# Patient Record
Sex: FEMALE | Race: OTHER
[De-identification: names, ages, dates, MRNs, and addresses within clinical notes are randomized per-mention and may not be internally consistent; named-entity substitution may affect disease eponyms.]

---

## 2017-04-13 ENCOUNTER — HOSPITAL ENCOUNTER (OUTPATIENT)
Dept: HOSPITAL 62 - WI | Age: 67
End: 2017-04-13
Attending: SURGERY
Payer: COMMERCIAL

## 2017-04-13 DIAGNOSIS — Z12.31: Primary | ICD-10-CM

## 2018-04-16 ENCOUNTER — HOSPITAL ENCOUNTER (OUTPATIENT)
Dept: HOSPITAL 62 - WI | Age: 68
End: 2018-04-16
Attending: SURGERY
Payer: MEDICARE

## 2018-04-16 DIAGNOSIS — Z12.31: Primary | ICD-10-CM

## 2018-04-16 NOTE — WOMENS IMAGING REPORT
EXAM DESCRIPTION:  RIGHT SCREENING MAMMO W/CAD



COMPLETED DATE/TIME:  4/16/2018 9:18 am



REASON FOR STUDY:  ROUTINE SCREENING;Z12.31 Z12.31  ENCNTR SCREEN MAMMOGRAM FOR MALIGNANT NEOPLASM OF
 SUE



COMPARISON:  4/13/2017 and 4/12/2016



TECHNIQUE:  Standard craniocaudal and mediolateral oblique views of the breast recorded using digital
 acquisition.



LIMITATIONS:  None.



FINDINGS:  BREAST: The right

Findings present which are benign by mammographic criteria. No suspicious masses, calcifications or a
rchitectural distortion.

Pertinent benign findings: Stable benign-appearing calcifications.

Read with the assistance of CAD.

.[facility] - R2 Cenova Version 1.3

.[facility] - R2 Cenova Version 1.3

.[facility] - R2 Cenova Version 2.4

.[facility] - R2 Cenova Version 2.4

.[facility] - R2  Version 9.2

Benign mammographic findings may include one or more of the following:  Smooth masses, popcorn/rim/co
arse calcifications, asymmetries, post-procedure changes, and lesions with long-standing stability.



IMPRESSION:  NORMAL MAMMOGRAM.  BIRADS 2.



BREAST DENSITY:  c. The breasts are heterogeneously dense, which may obscure small masses.



BIRAD:  2 BENIGN FINDING(S)



RECOMMENDATION:  RECOMMENDATION:  ROUTINE SCREENING.



COMMENT:  The patient has been notified of the results by letter per SA requirements. Additional no
tification policies are in place for contacting patient with suspicious or incomplete findings.

Quality ID #225: The American College of Radiology recommends an annual screening mammogram for women
 aged 40 years or over. This facility utilizes a reminder system to ensure that all patients receive 
reminder letters, and/or direct phone calls for appointments. This includes reminders for routine scr
eening mammograms, diagnostic mammograms, or other Breast Imaging Interventions when appropriate.  Th
is patient will be placed in the appropriate reminder system.

The American College of Radiology (ACR) has developed recommendations for screening MRI of the breast
s in certain patient populations, to be used in conjunction with mammography. Breast MRI surveillance
 may be appropriate for women with more than 20% lifetime risk of developing breast cancer as determi
zulema by genetic testing, significant family history of the disease, or history of mantle radiation for
 Hodgkins Disease. ACR Practice Guidelines 2008.



TECHNICAL DOCUMENTATION:  FINDING NUMBER: (1)

ASSESSMENT: (1)

JOB ID:  [id number]

 2011 [employer]- All Rights Reserved



Reading location - IP/workstation name: [email or web address]

## 2019-06-07 ENCOUNTER — HOSPITAL ENCOUNTER (OUTPATIENT)
Dept: HOSPITAL 62 - WI | Age: 69
End: 2019-06-07
Attending: INTERNAL MEDICINE
Payer: COMMERCIAL

## 2019-06-07 DIAGNOSIS — Z12.31: Primary | ICD-10-CM

## 2019-06-07 NOTE — WOMENS IMAGING REPORT
EXAM DESCRIPTION:  3D SCREENING MAMMO RIGHT



COMPLETED DATE/TIME:  6/7/2019 10:14 am



REASON FOR STUDY:  Z12.31 ROUTINE RT UNILATERAL SCREENING Z12.31  ENCNTR SCREEN MAMMOGRAM FOR MALIGNA
NT NEOPLASM OF SUE



COMPARISON:  4/16/2018 and 4/13/2017.



EXAM PARAMETERS:  Standard craniocaudal and mediolateral oblique views of the breast recorded using d
igital acquisition and breast tomosynthesis.

Read with the assistance of CAD.

.[facility] - R2  Version 9.2



LIMITATIONS:  None.



FINDINGS:  BREAST LATERALITY: right

No suspicious masses, suspicious calcifications or architectural distortion. No areas of suspicion.



IMPRESSION:  NEGATIVE  MAMMOGRAM.  BIRADS 1.



BREAST DENSITY:  c. The breasts are heterogeneously dense, which may obscure small masses.



BIRAD:  ASSESSMENT:  1 Negative



RECOMMENDATION:  RECOMMENDATION:  ROUTINE SCREENING.



COMMENT:  The patient has been notified of the results by letter per SA requirements. Additional no
tification policies are in place for contacting patient with suspicious or incomplete findings.

Quality ID #225: The American College of Radiology recommends an annual screening mammogram for women
 aged 40 years or over. This facility utilizes a reminder system to ensure that all patients receive 
reminder letters, and/or direct phone calls for appointments. This includes reminders for routine scr
eening mammograms, diagnostic mammograms, or other Breast Imaging Interventions when appropriate.  Th
is patient will be placed in the appropriate reminder system.



TECHNICAL DOCUMENTATION:  FINDING NUMBER: (1)

ASSESSMENT: (1)

JOB ID:  [id number]

 2011 [employer]- All Rights Reserved



Reading location - IP/workstation name: [email or web address]

## 2020-07-30 ENCOUNTER — HOSPITAL ENCOUNTER (OUTPATIENT)
Dept: HOSPITAL 62 - WI | Age: 70
End: 2020-07-30
Attending: INTERNAL MEDICINE
Payer: MEDICARE

## 2020-07-30 DIAGNOSIS — Z12.31: Primary | ICD-10-CM

## 2020-07-30 NOTE — WOMENS IMAGING REPORT
EXAM DESCRIPTION:  3D SCREENING MAMMO RIGHT



IMAGES COMPLETED DATE/TIME:  7/30/2020 7:58 am



REASON FOR STUDY:  Z12.31 ENCOUNTER FOR SCREENING MAMMOGRAM FOR MALIGNANT NEOPLASM OF BREAST Z12.31  
ENCNTR SCREEN MAMMOGRAM FOR MALIGNANT NEOPLASM OF SUE



COMPARISON:  2015



EXAM PARAMETERS:  Standard craniocaudal and mediolateral oblique views of the breast recorded using d
igital acquisition and breast tomosynthesis.

Read with the assistance of CAD.

.[facility]  Version 9.2



LIMITATIONS:  None.



FINDINGS:  BREAST LATERALITY: right

No suspicious masses, suspicious calcifications or architectural distortion. No areas of concern.



IMPRESSION:  NEGATIVE  MAMMOGRAM.  BIRADS 1.



BREAST DENSITY:  b. There are scattered areas of fibroglandular density.



BIRAD:  ASSESSMENT:  1 Negative



RECOMMENDATION:  RECOMMENDATION:  ROUTINE SCREENING.



COMMENT:  The patient has been notified of the results by letter per SA requirements. Additional no
tification policies are in place for contacting patient with suspicious or incomplete findings.

Quality ID #225: The American College of [employer] recommends an annual screening mammogram for women
 aged 40 years or over. This facility utilizes a reminder system to ensure that all patients receive 
reminder letters, and/or direct phone calls for appointments. This includes reminders for routine scr
eening mammograms, diagnostic mammograms, or other Breast Imaging Interventions when appropriate.  Th
is patient will be placed in the appropriate reminder system.



TECHNICAL DOCUMENTATION:  FINDING NUMBER: (1)

ASSESSMENT: (1)

JOB ID:  [id number]

 2011 [employer]- All Rights Reserved



Reading location - IP/workstation name: [email or web address]